# Patient Record
Sex: FEMALE | Race: WHITE
[De-identification: names, ages, dates, MRNs, and addresses within clinical notes are randomized per-mention and may not be internally consistent; named-entity substitution may affect disease eponyms.]

---

## 2018-02-01 ENCOUNTER — HOSPITAL ENCOUNTER (EMERGENCY)
Dept: HOSPITAL 17 - NEPK | Age: 28
Discharge: HOME | End: 2018-02-01
Payer: SELF-PAY

## 2018-02-01 VITALS
SYSTOLIC BLOOD PRESSURE: 127 MMHG | RESPIRATION RATE: 14 BRPM | OXYGEN SATURATION: 97 % | DIASTOLIC BLOOD PRESSURE: 70 MMHG | TEMPERATURE: 99.2 F | HEART RATE: 91 BPM

## 2018-02-01 VITALS — BODY MASS INDEX: 23.83 KG/M2 | WEIGHT: 126.21 LBS | HEIGHT: 61 IN

## 2018-02-01 DIAGNOSIS — T19.2XXA: Primary | ICD-10-CM

## 2018-02-01 PROCEDURE — 87210 SMEAR WET MOUNT SALINE/INK: CPT

## 2018-02-01 PROCEDURE — 87491 CHLMYD TRACH DNA AMP PROBE: CPT

## 2018-02-01 PROCEDURE — 87591 N.GONORRHOEAE DNA AMP PROB: CPT

## 2018-02-01 PROCEDURE — 99283 EMERGENCY DEPT VISIT LOW MDM: CPT

## 2018-02-01 NOTE — PD
HPI


Chief Complaint:  Gyn Problem/Complaint


Time Seen by Provider:  17:16


Travel History


International Travel<30 days:  No


Contact w/Intl Traveler<30days:  No


Traveled to known affect area:  No





History of Present Illness


HPI


This patient presents for foreign body removal.  She reports that she had a 

routine gynecologic examination today and she was told that there was a tampon 

in her vagina.  She was told by the examiner that they did not have the correct 

"tools" to remove the tampon and therefore she was referred here for tampon 

removal.  She is asymptomatic.  Symptoms are aggravated by the discovery of a 

tampon with no alleviating factors.  She has no other complaints at this time.





PFSH


Past Medical History


Pregnant?:  Not Pregnant


LMP:  1/2/18





Social History


Alcohol Use:  Yes


Tobacco Use:  No





Allergies-Medications


(Allergen,Severity, Reaction):  


Coded Allergies:  


     No Known Allergies (Unverified , 2/1/18)





Review of Systems


General / Constitutional:  No: Fever, Chills


Gastrointestinal:  No: Nausea, Vomiting, Abdominal Pain


Genitourinary:  Positive: Other (Positive for foreign body but denies foreign 

body sensation)





Physical Exam


Narrative


GENERAL: Well-developed well-nourished female in no acute distress


SKIN: Warm and dry.


HEAD: Atraumatic. Normocephalic. 


EYES: Pupils equal and round. No scleral icterus. No injection or drainage. 


ENT: No nasal bleeding or discharge.  Mucous membranes pink and moist.


NECK: Trachea midline. No JVD. 


CARDIOVASCULAR: Regular rate and rhythm.  No murmur appreciated.


RESPIRATORY: No accessory muscle use. Clear to auscultation. Breath sounds 

equal bilaterally. 


GASTROINTESTINAL: Abdomen soft, non-tender, nondistended. Hepatic and splenic 

margins not palpable. 


Pelvic examination the presence of a female nurse: There is an old appearing 

tampon which was easily removed with forceps.  There is some foul-smelling 

yellow discharge behind the tampon which was sent for wet prep and GC probe.





Data


Data


Last Documented VS





Vital Signs








  Date Time  Temp Pulse Resp B/P (MAP) Pulse Ox O2 Delivery O2 Flow Rate FiO2


 


2/1/18 15:37 99.2 91 14 127/70 (89) 97   








Orders





 Orders


Gc And Chlamydia Pcr (2/1/18 17:34)


Wet Prep Profile (2/1/18 17:34)





Labs





Laboratory Tests








Test


  2/1/18


16:00


 


Clue Cells (Wet Prep) NONE SEEN 


 


Vaginal Trichomonas (Wet Prep) NONE SEEN 


 


Vaginal Yeast (Wet Prep) NONE SEEN 











MDM


Medical Decision Making


Medical Screen Exam Complete:  Yes


Emergency Medical Condition:  Yes


Medical Record Reviewed:  Yes


Differential Diagnosis


Foreign body removal, bacterial vaginosis, trichomoniasis, toxic shock syndrome


Narrative Course


The tampon was easily removed.  Because of the findings GC probe and wet prep 

were sent.  The patient is only sexually active with one long-term partner and 

does not feel that chlamydia or gonorrhea are possibilities and would prefer to 

defer treatment which seems reasonable.


One prep is negative and most likely the discharge is secondary to the retained 

foreign body.  The patient is stable for discharge.





Diagnosis





 Primary Impression:  


 Hx of retained foreign body fully removed


***Med/Other Pt SpecificInfo:  No Change to Meds


Disposition:  01 DISCHARGE HOME


Condition:  Stable











Ceferino Torres Feb 1, 2018 17:23